# Patient Record
Sex: FEMALE | Race: WHITE | NOT HISPANIC OR LATINO | URBAN - METROPOLITAN AREA
[De-identification: names, ages, dates, MRNs, and addresses within clinical notes are randomized per-mention and may not be internally consistent; named-entity substitution may affect disease eponyms.]

---

## 2021-04-26 ENCOUNTER — APPOINTMENT (OUTPATIENT)
Dept: URBAN - METROPOLITAN AREA CLINIC 285 | Age: 71
Setting detail: DERMATOLOGY
End: 2021-04-26

## 2021-04-26 DIAGNOSIS — L21.8 OTHER SEBORRHEIC DERMATITIS: ICD-10-CM

## 2021-04-26 DIAGNOSIS — R20.2 PARESTHESIA OF SKIN: ICD-10-CM

## 2021-04-26 DIAGNOSIS — L64.8 OTHER ANDROGENIC ALOPECIA: ICD-10-CM

## 2021-04-26 PROCEDURE — OTHER ADDITIONAL NOTES: OTHER

## 2021-04-26 PROCEDURE — OTHER COUNSELING: OTHER

## 2021-04-26 PROCEDURE — OTHER PRESCRIPTION MEDICATION MANAGEMENT: OTHER

## 2021-04-26 PROCEDURE — OTHER MIPS QUALITY: OTHER

## 2021-04-26 PROCEDURE — 99204 OFFICE O/P NEW MOD 45 MIN: CPT

## 2021-04-26 PROCEDURE — OTHER PRESCRIPTION: OTHER

## 2021-04-26 PROCEDURE — OTHER MEDICATION COUNSELING: OTHER

## 2021-04-26 RX ORDER — FLUOCINONIDE GEL 0.5 MG/G
GEL TOPICAL
Qty: 1 | Refills: 3 | Status: ERX | COMMUNITY
Start: 2021-04-26

## 2021-04-26 RX ORDER — GABAPENTIN 100 MG/1
CAPSULE ORAL
Qty: 60 | Refills: 0 | COMMUNITY
Start: 2021-04-26

## 2021-04-26 RX ORDER — KETOCONAZOLE 20 MG/ML
SHAMPOO, SUSPENSION TOPICAL
Qty: 1 | Refills: 5 | Status: ERX | COMMUNITY
Start: 2021-04-26

## 2021-04-26 ASSESSMENT — LOCATION ZONE DERM: LOCATION ZONE: SCALP

## 2021-04-26 ASSESSMENT — LOCATION SIMPLE DESCRIPTION DERM: LOCATION SIMPLE: ANTERIOR SCALP

## 2021-04-26 ASSESSMENT — LOCATION DETAILED DESCRIPTION DERM: LOCATION DETAILED: MID-FRONTAL SCALP

## 2021-04-26 NOTE — PROCEDURE: PRESCRIPTION MEDICATION MANAGEMENT
Render In Strict Bullet Format?: No
Detail Level: Zone
Initiate Treatment: gabapentin 100 mg capsule. Take 2 by mouth at bedtime - currently on 100mg nightly\\nWas on 300mg which caused severe stomach discomfort
Plan: recheck in 2mo
Continue Regimen: Keep humidifier in room\\n
Initiate Treatment: ketoconazole 2 % shampoo. lather and leave on 1-2 three times weekly\\nfluocinonide 0.05 % topical gel. Apply to scalp BID for itch
Plan: Discussed switching to lyrica instead of gabapentin if no improvement or AEs

## 2021-04-26 NOTE — PROCEDURE: ADDITIONAL NOTES
Detail Level: Simple
Additional Notes: brothers and father with alopecia\\npatient with thyroid issue that is still being worked up
Render Risk Assessment In Note?: no

## 2021-04-26 NOTE — HPI: RASH
What Type Of Note Output Would You Prefer (Optional)?: Standard Output
Is The Patient Presenting As Previously Scheduled?: Yes
How Severe Is Your Rash?: moderate
Is This A New Presentation, Or A Follow-Up?: Rash
Additional History: Just started Reyes solution (chronic burning mouth ) \\nSelsun blue\\nHas anxiety? possibly restless leg\\ngabapentin 300mg gave her GI problems\\ncurrently taking 100mg

## 2021-04-26 NOTE — PROCEDURE: MEDICATION COUNSELING
6 Clindamycin Counseling: I counseled the patient regarding use of clindamycin as an antibiotic for prophylactic and/or therapeutic purposes. Clindamycin is active against numerous classes of bacteria, including skin bacteria. Side effects may include nausea, diarrhea, gastrointestinal upset, rash, hives, yeast infections, and in rare cases, colitis.

## 2021-04-26 NOTE — PROCEDURE: COUNSELING
Patient Specific Counseling (Will Not Stick From Patient To Patient): Anderson GIBSON discussed at length - defer for now\\nNutrafol supplementation, collagen powder supplementation\\nHair Max comb\\nHair transplant \\nPunch biopsy x 2 if considering oral medications
Detail Level: Detailed